# Patient Record
Sex: FEMALE | Race: WHITE | NOT HISPANIC OR LATINO | Employment: OTHER | URBAN - METROPOLITAN AREA
[De-identification: names, ages, dates, MRNs, and addresses within clinical notes are randomized per-mention and may not be internally consistent; named-entity substitution may affect disease eponyms.]

---

## 2021-01-14 DIAGNOSIS — Z23 NEED FOR VACCINATION: ICD-10-CM

## 2021-10-08 ENCOUNTER — OFFICE VISIT (OUTPATIENT)
Dept: URGENT CARE | Facility: CLINIC | Age: 86
End: 2021-10-08
Payer: MEDICARE

## 2021-10-08 VITALS
TEMPERATURE: 98.7 F | WEIGHT: 146 LBS | OXYGEN SATURATION: 93 % | HEART RATE: 73 BPM | DIASTOLIC BLOOD PRESSURE: 80 MMHG | SYSTOLIC BLOOD PRESSURE: 156 MMHG | RESPIRATION RATE: 14 BRPM

## 2021-10-08 DIAGNOSIS — R53.83 FATIGUE, UNSPECIFIED TYPE: ICD-10-CM

## 2021-10-08 LAB
APPEARANCE UR: NORMAL
BILIRUB UR STRIP-MCNC: NEGATIVE MG/DL
COLOR UR AUTO: NORMAL
EXTERNAL QUALITY CONTROL: NORMAL
GLUCOSE UR STRIP.AUTO-MCNC: NEGATIVE MG/DL
KETONES UR STRIP.AUTO-MCNC: NEGATIVE MG/DL
LEUKOCYTE ESTERASE UR QL STRIP.AUTO: NEGATIVE
NITRITE UR QL STRIP.AUTO: NEGATIVE
PH UR STRIP.AUTO: 6.5 [PH] (ref 5–8)
PROT UR QL STRIP: 100 MG/DL
RBC UR QL AUTO: NORMAL
SARS-COV+SARS-COV-2 AG RESP QL IA.RAPID: NEGATIVE
SP GR UR STRIP.AUTO: 1.02
UROBILINOGEN UR STRIP-MCNC: 0.2 MG/DL

## 2021-10-08 PROCEDURE — 87426 SARSCOV CORONAVIRUS AG IA: CPT | Performed by: PHYSICIAN ASSISTANT

## 2021-10-08 PROCEDURE — 81002 URINALYSIS NONAUTO W/O SCOPE: CPT | Performed by: PHYSICIAN ASSISTANT

## 2021-10-08 PROCEDURE — 99203 OFFICE O/P NEW LOW 30 MIN: CPT | Mod: CS | Performed by: PHYSICIAN ASSISTANT

## 2021-10-08 ASSESSMENT — ENCOUNTER SYMPTOMS
NECK PAIN: 0
PALPITATIONS: 0
CHILLS: 1
DIZZINESS: 0
FLANK PAIN: 0
SORE THROAT: 0
WEAKNESS: 0
FOCAL WEAKNESS: 0
BLURRED VISION: 0
SINUS PAIN: 0
ORTHOPNEA: 0
ABDOMINAL PAIN: 0
DIARRHEA: 0
SHORTNESS OF BREATH: 0
WEIGHT LOSS: 0
HEADACHES: 0
STRIDOR: 0
HEARTBURN: 0
SPUTUM PRODUCTION: 0
NAUSEA: 0
MYALGIAS: 0
WHEEZING: 0
DOUBLE VISION: 0
TINGLING: 0
SPEECH CHANGE: 0
DIAPHORESIS: 0
VOMITING: 0
SENSORY CHANGE: 0
COUGH: 0
FEVER: 0
TREMORS: 0

## 2021-10-08 NOTE — PROGRESS NOTES
Subjective:   Zenia Sidhu is a 86 y.o. female who presents for Other (feeling exhausted x a couple of days and feeling really tired and cold. pt preffers paper Rx)      HPI:  This is a very pleasant 86-year-old female presenting to the clinic with fatigue x2 days.  Patient recently moved to town 2 days ago from [place].  She drove across the country in 5 days.  States her symptoms are fairly mild at this time she just feels tired and slightly cold.  States she is staying at her son's house at the moment and it has been very chilly inside.  She has not noticed any other symptoms at this time.  Denies any chest pain, shortness of breath, lightheadedness, abdominal complaints, urinary complaints or paresthesias of the extremities.  She has no known ill contacts.  States she is still maintaining adequate nutrition and hydration.  Having normal bowel movements.  Denies any lower extremity swelling.  No new change to medications.  Currently does not have her medication list with her.  States she has a history of thyroid disease for which she is taking medication.  Also states she has a pacemaker in place for a prior history of A. fib.  Takes Crestor and has her cholesterol under control.  No history of DVT or PE.  Had a Covid test preformed yesterday in outside clinic has yet to receive results.      Review of Systems   Constitutional: Positive for chills and malaise/fatigue. Negative for diaphoresis, fever and weight loss.   HENT: Negative for congestion, ear pain, sinus pain and sore throat.    Eyes: Negative for blurred vision and double vision.   Respiratory: Negative for cough, sputum production, shortness of breath, wheezing and stridor.    Cardiovascular: Negative for chest pain, palpitations, orthopnea and leg swelling.   Gastrointestinal: Negative for abdominal pain, diarrhea, heartburn, nausea and vomiting.   Genitourinary: Negative for dysuria, flank pain, frequency, hematuria and urgency.    Musculoskeletal: Negative for myalgias and neck pain.   Skin: Negative for rash.   Neurological: Negative for dizziness, tingling, tremors, sensory change, speech change, focal weakness, weakness and headaches.   Endo/Heme/Allergies: Negative for environmental allergies.       Medications:    • This patient does not have an active medication from one of the medication groupers.    Allergies: Patient has no known allergies.    Problem List: Zenia Sidhu does not have a problem list on file.    Surgical History:  No past surgical history on file.    Past Social Hx: Zenia Sidhu  reports that she has never smoked. She has never used smokeless tobacco.     Past Family Hx:  Zenia Sidhu family history is not on file.     Problem list, medications, and allergies reviewed by myself today in Epic.     Objective:     /80 (BP Location: Left arm, Patient Position: Sitting, BP Cuff Size: Adult long)   Pulse 73   Temp 37.1 °C (98.7 °F) (Temporal)   Resp 14   Wt 66.2 kg (146 lb)   SpO2 93%     Physical Exam  Constitutional:       General: She is not in acute distress.     Appearance: Normal appearance. She is not ill-appearing, toxic-appearing or diaphoretic.   HENT:      Head: Normocephalic and atraumatic.      Right Ear: Tympanic membrane, ear canal and external ear normal.      Left Ear: Tympanic membrane, ear canal and external ear normal.      Nose: Nose normal. No congestion or rhinorrhea.      Mouth/Throat:      Mouth: Mucous membranes are moist.      Pharynx: No oropharyngeal exudate or posterior oropharyngeal erythema.   Eyes:      Extraocular Movements: Extraocular movements intact.      Conjunctiva/sclera: Conjunctivae normal.      Pupils: Pupils are equal, round, and reactive to light.   Cardiovascular:      Rate and Rhythm: Normal rate and regular rhythm.      Pulses: Normal pulses.      Heart sounds: Normal heart sounds.   Pulmonary:      Effort: Pulmonary effort is normal.      Breath  sounds: Normal breath sounds. No wheezing, rhonchi or rales.   Abdominal:      Palpations: Abdomen is soft.      Tenderness: There is no abdominal tenderness. There is no right CVA tenderness, left CVA tenderness or guarding.   Musculoskeletal:      Cervical back: Normal range of motion. No rigidity or tenderness. No muscular tenderness.   Lymphadenopathy:      Cervical: No cervical adenopathy.   Skin:     General: Skin is warm and dry.      Capillary Refill: Capillary refill takes less than 2 seconds.   Neurological:      General: No focal deficit present.      Mental Status: She is alert and oriented to person, place, and time. Mental status is at baseline.      Cranial Nerves: No cranial nerve deficit.      Motor: No weakness.      Gait: Gait normal.   Psychiatric:         Mood and Affect: Mood normal.         Thought Content: Thought content normal.       Lab Results/POC Test Results   Results for orders placed or performed in visit on 10/08/21   POCT Urinalysis   Result Value Ref Range    POC Color Mabel Negative    POC Appearance Cloudy Negative    POC Leukocyte Esterase Negative Negative    POC Nitrites Negative Negative    POC Urobiligen 0.2 Negative (0.2) mg/dL    POC Protein 100 Negative mg/dL    POC Urine PH 6.5 5.0 - 8.0    POC Blood Trace-Intact Negative    POC Specific Gravity 1.025 <1.005 - >1.030    POC Ketones Negative Negative mg/dL    POC Bilirubin Negative Negative mg/dL    POC Glucose Negative Negative mg/dL   POCT SARS-COV Antigen LOLIS (Symptomatic Only)   Result Value Ref Range    Internal  Pass     SARS-COV ANTIGEN LOLIS Negative              Assessment/Plan:     Comments/MDM:     • Differential diagnosis and treatment options were discussed with the patient at length today.  Patient has been dealing with fatigue for the last 2 days.  No other symptoms are present.  Recently moved to town from [place].  They drove across the country in 5 days.  Believes she may not have been  eating and drinking enough.  States she has tried to increase her intake over the last couple days.  It is very cold at her son's house she mentions to me.  On exam patient has all normal findings.  Lung sounds clear to auscultation.  Heart is appropriately paced.  Pulses palpable in all extremities.  No focal neurological deficits.  Urinalysis within normal limits.  Negative rapid Covid test.  Currently pending PCR Covid test.  Discussed the patient that there is a vast amount of differentials that can cause fatigue.  She understands of her symptoms worsen or continue for any length of time to go to the emergency department for a more in-depth evaluation.  Please call with any questions or concerns.     Diagnosis and associated orders:     1. Fatigue, unspecified type  POCT Urinalysis    POCT SARS-COV Antigen LOLIS (Symptomatic Only)              Differential diagnosis, natural history, supportive care, and indications for immediate follow-up discussed.    Advised the patient to follow-up with the primary care physician for recheck, reevaluation, and consideration of further management.    Please note that this dictation was created using voice recognition software. I have made reasonable attempt to correct obvious errors, but I expect that there are errors of grammar and possibly content that I did not discover before finalizing the note.    This note was electronically signed by THOMAS Busby PA-C

## 2021-10-15 ENCOUNTER — HOSPITAL ENCOUNTER (INPATIENT)
Facility: MEDICAL CENTER | Age: 86
LOS: 1 days | DRG: 178 | End: 2021-10-16
Attending: EMERGENCY MEDICINE | Admitting: INTERNAL MEDICINE
Payer: MEDICARE

## 2021-10-15 ENCOUNTER — APPOINTMENT (OUTPATIENT)
Dept: RADIOLOGY | Facility: MEDICAL CENTER | Age: 86
DRG: 178 | End: 2021-10-15
Attending: EMERGENCY MEDICINE
Payer: MEDICARE

## 2021-10-15 DIAGNOSIS — N39.0 URINARY TRACT INFECTION WITHOUT HEMATURIA, SITE UNSPECIFIED: ICD-10-CM

## 2021-10-15 DIAGNOSIS — R09.02 HYPOXIA: ICD-10-CM

## 2021-10-15 DIAGNOSIS — U07.1 COVID-19 VIRUS INFECTION: ICD-10-CM

## 2021-10-15 DIAGNOSIS — I50.21 ACUTE SYSTOLIC CONGESTIVE HEART FAILURE (HCC): ICD-10-CM

## 2021-10-15 PROBLEM — R79.89 ELEVATED TROPONIN: Status: ACTIVE | Noted: 2021-10-15

## 2021-10-15 PROBLEM — I48.0 PAROXYSMAL ATRIAL FIBRILLATION (HCC): Chronic | Status: ACTIVE | Noted: 2021-10-15

## 2021-10-15 PROBLEM — E03.9 HYPOTHYROIDISM: Status: ACTIVE | Noted: 2021-10-15

## 2021-10-15 PROBLEM — I10 HTN (HYPERTENSION): Chronic | Status: ACTIVE | Noted: 2021-10-15

## 2021-10-15 PROBLEM — R06.02 SOB (SHORTNESS OF BREATH): Status: ACTIVE | Noted: 2021-10-15

## 2021-10-15 LAB
ALBUMIN SERPL BCP-MCNC: 3.9 G/DL (ref 3.2–4.9)
ALBUMIN/GLOB SERPL: 1.2 G/DL
ALP SERPL-CCNC: 73 U/L (ref 30–99)
ALT SERPL-CCNC: 41 U/L (ref 2–50)
ANION GAP SERPL CALC-SCNC: 11 MMOL/L (ref 7–16)
ANISOCYTOSIS BLD QL SMEAR: ABNORMAL
APPEARANCE UR: ABNORMAL
AST SERPL-CCNC: 56 U/L (ref 12–45)
BACTERIA #/AREA URNS HPF: ABNORMAL /HPF
BASOPHILS # BLD AUTO: 0.9 % (ref 0–1.8)
BASOPHILS # BLD: 0.05 K/UL (ref 0–0.12)
BILIRUB SERPL-MCNC: 1.1 MG/DL (ref 0.1–1.5)
BILIRUB UR QL STRIP.AUTO: ABNORMAL
BUN SERPL-MCNC: 16 MG/DL (ref 8–22)
BURR CELLS BLD QL SMEAR: NORMAL
CALCIUM SERPL-MCNC: 8.9 MG/DL (ref 8.5–10.5)
CHLORIDE SERPL-SCNC: 97 MMOL/L (ref 96–112)
CO2 SERPL-SCNC: 24 MMOL/L (ref 20–33)
COLOR UR: ABNORMAL
CREAT SERPL-MCNC: 0.77 MG/DL (ref 0.5–1.4)
EKG IMPRESSION: NORMAL
EOSINOPHIL # BLD AUTO: 0 K/UL (ref 0–0.51)
EOSINOPHIL NFR BLD: 0 % (ref 0–6.9)
EPI CELLS #/AREA URNS HPF: ABNORMAL /HPF
ERYTHROCYTE [DISTWIDTH] IN BLOOD BY AUTOMATED COUNT: 53.2 FL (ref 35.9–50)
FLUAV AG SPEC QL IA: NEGATIVE
FLUBV AG SPEC QL IA: NEGATIVE
GLOBULIN SER CALC-MCNC: 3.3 G/DL (ref 1.9–3.5)
GLUCOSE SERPL-MCNC: 118 MG/DL (ref 65–99)
GLUCOSE UR STRIP.AUTO-MCNC: NEGATIVE MG/DL
HCT VFR BLD AUTO: 36.9 % (ref 37–47)
HGB BLD-MCNC: 12.2 G/DL (ref 12–16)
HYALINE CASTS #/AREA URNS LPF: ABNORMAL /LPF
KETONES UR STRIP.AUTO-MCNC: 15 MG/DL
LEUKOCYTE ESTERASE UR QL STRIP.AUTO: ABNORMAL
LYMPHOCYTES # BLD AUTO: 0.39 K/UL (ref 1–4.8)
LYMPHOCYTES NFR BLD: 7 % (ref 22–41)
MAGNESIUM SERPL-MCNC: 2 MG/DL (ref 1.5–2.5)
MANUAL DIFF BLD: NORMAL
MCH RBC QN AUTO: 32 PG (ref 27–33)
MCHC RBC AUTO-ENTMCNC: 33.1 G/DL (ref 33.6–35)
MCV RBC AUTO: 96.9 FL (ref 81.4–97.8)
MICRO URNS: ABNORMAL
MONOCYTES # BLD AUTO: 0.25 K/UL (ref 0–0.85)
MONOCYTES NFR BLD AUTO: 4.4 % (ref 0–13.4)
MORPHOLOGY BLD-IMP: NORMAL
NEUTROPHILS # BLD AUTO: 4.91 K/UL (ref 2–7.15)
NEUTROPHILS NFR BLD: 87.7 % (ref 44–72)
NITRITE UR QL STRIP.AUTO: POSITIVE
NRBC # BLD AUTO: 0 K/UL
NRBC BLD-RTO: 0 /100 WBC
NT-PROBNP SERPL IA-MCNC: 1865 PG/ML (ref 0–125)
PH UR STRIP.AUTO: 5.5 [PH] (ref 5–8)
PLATELET # BLD AUTO: 171 K/UL (ref 164–446)
PLATELET BLD QL SMEAR: NORMAL
PMV BLD AUTO: 11.1 FL (ref 9–12.9)
POIKILOCYTOSIS BLD QL SMEAR: NORMAL
POTASSIUM SERPL-SCNC: 4 MMOL/L (ref 3.6–5.5)
PROT SERPL-MCNC: 7.2 G/DL (ref 6–8.2)
PROT UR QL STRIP: 300 MG/DL
RBC # BLD AUTO: 3.81 M/UL (ref 4.2–5.4)
RBC # URNS HPF: ABNORMAL /HPF
RBC BLD AUTO: PRESENT
RBC UR QL AUTO: ABNORMAL
SARS-COV+SARS-COV-2 AG RESP QL IA.RAPID: DETECTED
SODIUM SERPL-SCNC: 132 MMOL/L (ref 135–145)
SP GR UR STRIP.AUTO: 1.03
SPECIMEN SOURCE: ABNORMAL
TROPONIN T SERPL-MCNC: 21 NG/L (ref 6–19)
UROBILINOGEN UR STRIP.AUTO-MCNC: 1 MG/DL
WBC # BLD AUTO: 5.6 K/UL (ref 4.8–10.8)
WBC #/AREA URNS HPF: ABNORMAL /HPF

## 2021-10-15 PROCEDURE — 99223 1ST HOSP IP/OBS HIGH 75: CPT | Mod: AI | Performed by: INTERNAL MEDICINE

## 2021-10-15 PROCEDURE — 80053 COMPREHEN METABOLIC PANEL: CPT

## 2021-10-15 PROCEDURE — 700111 HCHG RX REV CODE 636 W/ 250 OVERRIDE (IP): Performed by: EMERGENCY MEDICINE

## 2021-10-15 PROCEDURE — 700102 HCHG RX REV CODE 250 W/ 637 OVERRIDE(OP): Performed by: STUDENT IN AN ORGANIZED HEALTH CARE EDUCATION/TRAINING PROGRAM

## 2021-10-15 PROCEDURE — 99285 EMERGENCY DEPT VISIT HI MDM: CPT

## 2021-10-15 PROCEDURE — 85027 COMPLETE CBC AUTOMATED: CPT

## 2021-10-15 PROCEDURE — 81001 URINALYSIS AUTO W/SCOPE: CPT

## 2021-10-15 PROCEDURE — 96374 THER/PROPH/DIAG INJ IV PUSH: CPT

## 2021-10-15 PROCEDURE — 93005 ELECTROCARDIOGRAM TRACING: CPT | Performed by: EMERGENCY MEDICINE

## 2021-10-15 PROCEDURE — 87077 CULTURE AEROBIC IDENTIFY: CPT

## 2021-10-15 PROCEDURE — 93005 ELECTROCARDIOGRAM TRACING: CPT

## 2021-10-15 PROCEDURE — 83735 ASSAY OF MAGNESIUM: CPT

## 2021-10-15 PROCEDURE — 87186 SC STD MICRODIL/AGAR DIL: CPT

## 2021-10-15 PROCEDURE — 83880 ASSAY OF NATRIURETIC PEPTIDE: CPT

## 2021-10-15 PROCEDURE — 84484 ASSAY OF TROPONIN QUANT: CPT

## 2021-10-15 PROCEDURE — 87400 INFLUENZA A/B EACH AG IA: CPT | Mod: 91

## 2021-10-15 PROCEDURE — 71045 X-RAY EXAM CHEST 1 VIEW: CPT

## 2021-10-15 PROCEDURE — 87426 SARSCOV CORONAVIRUS AG IA: CPT

## 2021-10-15 PROCEDURE — C9803 HOPD COVID-19 SPEC COLLECT: HCPCS | Performed by: EMERGENCY MEDICINE

## 2021-10-15 PROCEDURE — A9270 NON-COVERED ITEM OR SERVICE: HCPCS | Performed by: STUDENT IN AN ORGANIZED HEALTH CARE EDUCATION/TRAINING PROGRAM

## 2021-10-15 PROCEDURE — 96375 TX/PRO/DX INJ NEW DRUG ADDON: CPT

## 2021-10-15 PROCEDURE — 700102 HCHG RX REV CODE 250 W/ 637 OVERRIDE(OP): Performed by: INTERNAL MEDICINE

## 2021-10-15 PROCEDURE — A9270 NON-COVERED ITEM OR SERVICE: HCPCS | Performed by: INTERNAL MEDICINE

## 2021-10-15 PROCEDURE — 8E0ZXY6 ISOLATION: ICD-10-PCS | Performed by: INTERNAL MEDICINE

## 2021-10-15 PROCEDURE — 87086 URINE CULTURE/COLONY COUNT: CPT

## 2021-10-15 PROCEDURE — 770020 HCHG ROOM/CARE - TELE (206)

## 2021-10-15 PROCEDURE — 85007 BL SMEAR W/DIFF WBC COUNT: CPT

## 2021-10-15 RX ORDER — BISOPROLOL FUMARATE 5 MG/1
5 TABLET, FILM COATED ORAL 2 TIMES DAILY
Status: DISCONTINUED | OUTPATIENT
Start: 2021-10-15 | End: 2021-10-16 | Stop reason: HOSPADM

## 2021-10-15 RX ORDER — BISOPROLOL FUMARATE 5 MG/1
5 TABLET, FILM COATED ORAL 2 TIMES DAILY
COMMUNITY

## 2021-10-15 RX ORDER — DEXAMETHASONE SODIUM PHOSPHATE 4 MG/ML
6 INJECTION, SOLUTION INTRA-ARTICULAR; INTRALESIONAL; INTRAMUSCULAR; INTRAVENOUS; SOFT TISSUE ONCE
Status: COMPLETED | OUTPATIENT
Start: 2021-10-15 | End: 2021-10-15

## 2021-10-15 RX ORDER — ISOSORBIDE MONONITRATE 30 MG/1
30 TABLET, EXTENDED RELEASE ORAL
COMMUNITY

## 2021-10-15 RX ORDER — ISOSORBIDE MONONITRATE 30 MG/1
30 TABLET, EXTENDED RELEASE ORAL
Status: DISCONTINUED | OUTPATIENT
Start: 2021-10-16 | End: 2021-10-16 | Stop reason: HOSPADM

## 2021-10-15 RX ORDER — ENALAPRILAT 1.25 MG/ML
1.25 INJECTION INTRAVENOUS EVERY 6 HOURS PRN
Status: DISCONTINUED | OUTPATIENT
Start: 2021-10-15 | End: 2021-10-16 | Stop reason: HOSPADM

## 2021-10-15 RX ORDER — ONDANSETRON 2 MG/ML
4 INJECTION INTRAMUSCULAR; INTRAVENOUS EVERY 4 HOURS PRN
Status: DISCONTINUED | OUTPATIENT
Start: 2021-10-15 | End: 2021-10-15

## 2021-10-15 RX ORDER — LABETALOL HYDROCHLORIDE 5 MG/ML
10 INJECTION, SOLUTION INTRAVENOUS EVERY 4 HOURS PRN
Status: DISCONTINUED | OUTPATIENT
Start: 2021-10-15 | End: 2021-10-16 | Stop reason: HOSPADM

## 2021-10-15 RX ORDER — ONDANSETRON 4 MG/1
4 TABLET, ORALLY DISINTEGRATING ORAL EVERY 4 HOURS PRN
Status: DISCONTINUED | OUTPATIENT
Start: 2021-10-15 | End: 2021-10-15

## 2021-10-15 RX ORDER — CEFTRIAXONE 1 G/1
1 INJECTION, POWDER, FOR SOLUTION INTRAMUSCULAR; INTRAVENOUS ONCE
Status: COMPLETED | OUTPATIENT
Start: 2021-10-15 | End: 2021-10-15

## 2021-10-15 RX ORDER — LISINOPRIL 5 MG/1
5 TABLET ORAL
Status: DISCONTINUED | OUTPATIENT
Start: 2021-10-16 | End: 2021-10-16 | Stop reason: HOSPADM

## 2021-10-15 RX ORDER — LEVOTHYROXINE SODIUM 112 UG/1
112 TABLET ORAL SEE ADMIN INSTRUCTIONS
COMMUNITY

## 2021-10-15 RX ORDER — ROSUVASTATIN CALCIUM 10 MG/1
10 TABLET, COATED ORAL EVERY EVENING
Status: DISCONTINUED | OUTPATIENT
Start: 2021-10-15 | End: 2021-10-16 | Stop reason: HOSPADM

## 2021-10-15 RX ORDER — AMOXICILLIN 250 MG
2 CAPSULE ORAL 2 TIMES DAILY
Status: DISCONTINUED | OUTPATIENT
Start: 2021-10-15 | End: 2021-10-16 | Stop reason: HOSPADM

## 2021-10-15 RX ORDER — FUROSEMIDE 10 MG/ML
40 INJECTION INTRAMUSCULAR; INTRAVENOUS ONCE
Status: COMPLETED | OUTPATIENT
Start: 2021-10-15 | End: 2021-10-15

## 2021-10-15 RX ORDER — CHOLECALCIFEROL (VITAMIN D3) 125 MCG
5 CAPSULE ORAL NIGHTLY PRN
Status: DISCONTINUED | OUTPATIENT
Start: 2021-10-15 | End: 2021-10-16 | Stop reason: HOSPADM

## 2021-10-15 RX ORDER — POTASSIUM CHLORIDE 750 MG/1
10 TABLET, EXTENDED RELEASE ORAL
COMMUNITY

## 2021-10-15 RX ORDER — ROSUVASTATIN CALCIUM 10 MG/1
10 TABLET, COATED ORAL EVERY EVENING
COMMUNITY

## 2021-10-15 RX ORDER — FUROSEMIDE 10 MG/ML
20 INJECTION INTRAMUSCULAR; INTRAVENOUS
Status: DISCONTINUED | OUTPATIENT
Start: 2021-10-15 | End: 2021-10-16 | Stop reason: HOSPADM

## 2021-10-15 RX ORDER — LEVOTHYROXINE SODIUM 112 UG/1
112 TABLET ORAL
Status: DISCONTINUED | OUTPATIENT
Start: 2021-10-16 | End: 2021-10-16 | Stop reason: HOSPADM

## 2021-10-15 RX ORDER — POLYETHYLENE GLYCOL 3350 17 G/17G
1 POWDER, FOR SOLUTION ORAL
Status: DISCONTINUED | OUTPATIENT
Start: 2021-10-15 | End: 2021-10-16 | Stop reason: HOSPADM

## 2021-10-15 RX ORDER — BISACODYL 10 MG
10 SUPPOSITORY, RECTAL RECTAL
Status: DISCONTINUED | OUTPATIENT
Start: 2021-10-15 | End: 2021-10-16 | Stop reason: HOSPADM

## 2021-10-15 RX ADMIN — BISOPROLOL FUMARATE 5 MG: 5 TABLET, FILM COATED ORAL at 23:28

## 2021-10-15 RX ADMIN — Medication 5 MG: at 23:07

## 2021-10-15 RX ADMIN — APIXABAN 5 MG: 5 TABLET, FILM COATED ORAL at 20:34

## 2021-10-15 RX ADMIN — ASPIRIN 81 MG: 81 TABLET, COATED ORAL at 20:34

## 2021-10-15 RX ADMIN — DEXAMETHASONE SODIUM PHOSPHATE 6 MG: 4 INJECTION, SOLUTION INTRA-ARTICULAR; INTRALESIONAL; INTRAMUSCULAR; INTRAVENOUS; SOFT TISSUE at 20:40

## 2021-10-15 RX ADMIN — CEFTRIAXONE SODIUM 1 G: 1 INJECTION, POWDER, FOR SOLUTION INTRAMUSCULAR; INTRAVENOUS at 18:50

## 2021-10-15 RX ADMIN — ROSUVASTATIN CALCIUM 10 MG: 10 TABLET, FILM COATED ORAL at 23:07

## 2021-10-15 RX ADMIN — FUROSEMIDE 40 MG: 10 INJECTION, SOLUTION INTRAMUSCULAR; INTRAVENOUS at 18:50

## 2021-10-15 ASSESSMENT — CHA2DS2 SCORE
VASCULAR DISEASE: YES
CHF OR LEFT VENTRICULAR DYSFUNCTION: YES
SEX: FEMALE
CHA2DS2 VASC SCORE: 6
AGE 75 OR GREATER: YES
PRIOR STROKE OR TIA OR THROMBOEMBOLISM: NO
HYPERTENSION: YES
AGE 65 TO 74: NO
DIABETES: NO

## 2021-10-15 ASSESSMENT — COGNITIVE AND FUNCTIONAL STATUS - GENERAL
PERSONAL GROOMING: A LITTLE
TOILETING: A LITTLE
EATING MEALS: A LITTLE
CLIMB 3 TO 5 STEPS WITH RAILING: A LITTLE
MOBILITY SCORE: 21
DAILY ACTIVITIY SCORE: 18
DRESSING REGULAR LOWER BODY CLOTHING: A LITTLE
SUGGESTED CMS G CODE MODIFIER MOBILITY: CJ
WALKING IN HOSPITAL ROOM: A LITTLE
SUGGESTED CMS G CODE MODIFIER DAILY ACTIVITY: CK
DRESSING REGULAR UPPER BODY CLOTHING: A LITTLE
HELP NEEDED FOR BATHING: A LITTLE
MOVING FROM LYING ON BACK TO SITTING ON SIDE OF FLAT BED: A LITTLE

## 2021-10-15 ASSESSMENT — LIFESTYLE VARIABLES
ALCOHOL_USE: YES
HOW MANY TIMES IN THE PAST YEAR HAVE YOU HAD 5 OR MORE DRINKS IN A DAY: 0
EVER FELT BAD OR GUILTY ABOUT YOUR DRINKING: NO
TOTAL SCORE: 0
TOTAL SCORE: 0
CONSUMPTION TOTAL: NEGATIVE
ON A TYPICAL DAY WHEN YOU DRINK ALCOHOL HOW MANY DRINKS DO YOU HAVE: 0
HAVE PEOPLE ANNOYED YOU BY CRITICIZING YOUR DRINKING: NO
AVERAGE NUMBER OF DAYS PER WEEK YOU HAVE A DRINK CONTAINING ALCOHOL: 1
TOTAL SCORE: 0
EVER HAD A DRINK FIRST THING IN THE MORNING TO STEADY YOUR NERVES TO GET RID OF A HANGOVER: NO
DO YOU DRINK ALCOHOL: NO
HAVE YOU EVER FELT YOU SHOULD CUT DOWN ON YOUR DRINKING: NO

## 2021-10-15 ASSESSMENT — ENCOUNTER SYMPTOMS
MUSCULOSKELETAL NEGATIVE: 1
WEAKNESS: 1
PSYCHIATRIC NEGATIVE: 1
SHORTNESS OF BREATH: 1
GASTROINTESTINAL NEGATIVE: 1
EYES NEGATIVE: 1
ORTHOPNEA: 1
CONSTITUTIONAL NEGATIVE: 1

## 2021-10-15 ASSESSMENT — PATIENT HEALTH QUESTIONNAIRE - PHQ9
1. LITTLE INTEREST OR PLEASURE IN DOING THINGS: NOT AT ALL
SUM OF ALL RESPONSES TO PHQ9 QUESTIONS 1 AND 2: 0
2. FEELING DOWN, DEPRESSED, IRRITABLE, OR HOPELESS: NOT AT ALL

## 2021-10-15 ASSESSMENT — FIBROSIS 4 INDEX: FIB4 SCORE: 4.35

## 2021-10-15 NOTE — ED TRIAGE NOTES
"Pt to triage, c/o not \" feeling well for the last week\" , c/o SOB/ cough / fatigue.  Pt states \" she has had 3 -4 covid tests in the last week, all negative\" . Pt has recently moved here with her  from [place]. Pt not sure if it is the altitude. Has  a hx of heart problems\" / EKG ordered  "

## 2021-10-15 NOTE — ED PROVIDER NOTES
ED Provider Note    CHIEF COMPLAINT  Chief Complaint   Patient presents with   • Fatigue   • Loss of Appetite       HPI  Zenia Sidhu is a 86 y.o. female who presents with a week of fatigue, 3 to 4 days of cough and shortness of breath with exertion.  She has felt chilled but no fever.  No headache, body aches, vomiting.  She has had some diarrhea.  No loss of taste or smell.  She is vaccinated for Covid.  She has had 5 - test this week.  She just moved here from [place].  History of pacemaker and A. fib on Eliquis.  No history of DVT PE heart attack or heart failure.  She had 3 hours of chest pain this morning in the right upper chest but is chest pain-free now.  No abdominal pain.    REVIEW OF SYSTEMS  Pertinent positives include: Fatigue, dyspnea with exertion, chilled, cough.  Pertinent negatives include: Leg swelling, calf pain, DVT or PE history, orthopnea.  10+ systems reviewed and negative.      PAST MEDICAL HISTORY  Atrial fibrillation, pacemaker    SOCIAL HISTORY  Currently moved here from [place] 5 days ago and traveled here by car    CURRENT MEDICATIONS  Includes Eliquis, see Epic.    ALLERGIES  No Known Allergies    PHYSICAL EXAM  VITAL SIGNS: /76   Pulse 81   Temp 36.4 °C (97.5 °F) (Temporal)   Resp 16   Wt 62.5 kg (137 lb 12.6 oz)   SpO2 100%   Reviewed and afebrile, no hypoxia room air  Constitutional: Well developed, Well nourished, well-appearing.  HENT: Normocephalic, atraumatic, bilateral external ears normal, Wearing mask.   Eyes: PERRLA, conjunctiva pink, no scleral icterus.   Cardiovascular: Regular S1-S2 without murmur, rub, gallop.  No dependent edema or calf tenderness.  No JVD or bruit.  Respiratory: No rhonchi, wheeze or cough.  Rales at the bases.  Gastrointestinal: Soft, nontender, nondistended, no organomegaly.  Skin: No erythema, no rash.  Venous dermopathy change of the legs  Genitourinary:  No costovertebral angle tenderness.   Neurologic: Alert & oriented x 3,  cranial nerves 2-12 intact by passive exam.  No focal deficit noted.  Psychiatric: Affect normal, Judgment normal, Mood normal.     DIFFERENTIAL DIAGNOSIS:  Covid, pneumonia, heart failure, doubt PE, UTI, sepsis.    EKG  EKG Interpretation 5:01 PM    Interpreted by me.  Indication: Chest pain    Rhythm: Irregularly irregular  Rate: Normal at 78  Axis: normal  Ectopy: none  Conduction: Sure ventricular conduction delay pattern  ST/T Waves: no acute change  Q Waves: none  R Wave progression: Inferior and septal Q's  High voltage QRS    Clinical Impression: Rate Controlled atrial fibrillation, intraventricular conduction delay, LVH, prior inferior and anterior ischemia      RADIOLOGY/PROCEDURES  DX-CHEST-PORTABLE (1 VIEW)   Final Result      1.  Mild cardiomegaly and minimal interstitial edema.   2.  A few areas of linear atelectasis/scarring.             LABORATORY:  Results for orders placed or performed during the hospital encounter of 10/15/21   CBC with Differential   Result Value Ref Range    WBC 5.6 4.8 - 10.8 K/uL    RBC 3.81 (L) 4.20 - 5.40 M/uL    Hemoglobin 12.2 12.0 - 16.0 g/dL    Hematocrit 36.9 (L) 37.0 - 47.0 %    MCV 96.9 81.4 - 97.8 fL    MCH 32.0 27.0 - 33.0 pg    MCHC 33.1 (L) 33.6 - 35.0 g/dL    RDW 53.2 (H) 35.9 - 50.0 fL    Platelet Count 171 164 - 446 K/uL    MPV 11.1 9.0 - 12.9 fL    Neutrophils-Polys 87.70 (H) 44.00 - 72.00 %    Lymphocytes 7.00 (L) 22.00 - 41.00 %    Monocytes 4.40 0.00 - 13.40 %    Eosinophils 0.00 0.00 - 6.90 %    Basophils 0.90 0.00 - 1.80 %    Nucleated RBC 0.00 /100 WBC    Neutrophils (Absolute) 4.91 2.00 - 7.15 K/uL    Lymphs (Absolute) 0.39 (L) 1.00 - 4.80 K/uL    Monos (Absolute) 0.25 0.00 - 0.85 K/uL    Eos (Absolute) 0.00 0.00 - 0.51 K/uL    Baso (Absolute) 0.05 0.00 - 0.12 K/uL    NRBC (Absolute) 0.00 K/uL    Anisocytosis 1+    Comp Metabolic Panel   Result Value Ref Range    Sodium 132 (L) 135 - 145 mmol/L    Potassium 4.0 3.6 - 5.5 mmol/L    Chloride 97 96 - 112  mmol/L    Co2 24 20 - 33 mmol/L    Anion Gap 11.0 7.0 - 16.0    Glucose 118 (H) 65 - 99 mg/dL    Bun 16 8 - 22 mg/dL    Creatinine 0.77 0.50 - 1.40 mg/dL    Calcium 8.9 8.5 - 10.5 mg/dL    AST(SGOT) 56 (H) 12 - 45 U/L    ALT(SGPT) 41 2 - 50 U/L    Alkaline Phosphatase 73 30 - 99 U/L    Total Bilirubin 1.1 0.1 - 1.5 mg/dL    Albumin 3.9 3.2 - 4.9 g/dL    Total Protein 7.2 6.0 - 8.2 g/dL    Globulin 3.3 1.9 - 3.5 g/dL    A-G Ratio 1.2 g/dL   ESTIMATED GFR   Result Value Ref Range    GFR If African American >60 >60 mL/min/1.73 m 2    GFR If Non African American >60 >60 mL/min/1.73 m 2   DIFFERENTIAL MANUAL   Result Value Ref Range    Manual Diff Status PERFORMED    PERIPHERAL SMEAR REVIEW   Result Value Ref Range    Peripheral Smear Review see below    PLATELET ESTIMATE   Result Value Ref Range    Plt Estimation Normal    MORPHOLOGY   Result Value Ref Range    RBC Morphology Present     Poikilocytosis 1+     Echinocytes 1+    CoV, Flu A/B RAPID ANTIGEN: Collect one dry nasal swab    Specimen: Nasopharyngeal; Respirate   Result Value Ref Range    Influenza A AG by LOLIS Negative Negative    Influenza B AG by LOLIS Negative Negative    SARS-COV ANTIGEN LOLIS DETECTED (AA) Not-Detected    SARS-CoV-2 Source Nasal Swab    proBrain Natriuretic Peptide, NT   Result Value Ref Range    NT-proBNP 1865 (H) 0 - 125 pg/mL   TROPONIN   Result Value Ref Range    Troponin T 21 (H) 6 - 19 ng/L   URINALYSIS    Specimen: Urine   Result Value Ref Range    Color DK Yellow     Character Turbid (A)     Specific Gravity 1.028 <1.035    Ph 5.5 5.0 - 8.0    Glucose Negative Negative mg/dL    Ketones 15 (A) Negative mg/dL    Protein 300 (A) Negative mg/dL    Bilirubin Small (A) Negative    Urobilinogen, Urine 1.0 Negative    Nitrite Positive (A) Negative    Leukocyte Esterase Moderate (A) Negative    Occult Blood Moderate (A) Negative    Micro Urine Req Microscopic    URINE MICROSCOPIC (W/UA)   Result Value Ref Range    WBC Packed (A) /hpf    RBC 5-10  (A) /hpf    Bacteria Many (A) None /hpf    Epithelial Cells Few /hpf    Hyaline Cast 11-20 (A) /lpf       INTERVENTIONS:  Medications   dexamethasone (DECADRON) injection 6 mg (has no administration in time range)   cefTRIAXone (Rocephin) injection 1 g (1 g Intravenous Given 10/15/21 1850)   furosemide (LASIX) injection 40 mg (40 mg Intravenous Given 10/15/21 1850)       COURSE & MEDICAL DECISION MAKING  Well-appearing patient who is extremely fatigued and dyspneic has probable new onset left-sided heart failure, UTI without severe sepsis and Covid with transient hypoxia here if she dips to 88% on room air.  She will require admission for echocardiogram, oxygen, diuresis and antibiotics.  Case discussed with the hospitalist who will admit the patient..    PLAN:  As above    CONDITION: Guarded.    FINAL IMPRESSION  1. Acute systolic congestive heart failure (HCC)    2. Urinary tract infection without hematuria, site unspecified    3. COVID-19 virus infection    4. Hypoxia          Electronically signed by: Parth Hubbard M.D., 10/15/2021 4:58 PM

## 2021-10-16 ENCOUNTER — PATIENT OUTREACH (OUTPATIENT)
Dept: HEALTH INFORMATION MANAGEMENT | Facility: OTHER | Age: 86
End: 2021-10-16

## 2021-10-16 ENCOUNTER — APPOINTMENT (OUTPATIENT)
Dept: CARDIOLOGY | Facility: MEDICAL CENTER | Age: 86
DRG: 178 | End: 2021-10-16
Attending: INTERNAL MEDICINE
Payer: MEDICARE

## 2021-10-16 ENCOUNTER — APPOINTMENT (OUTPATIENT)
Dept: RADIOLOGY | Facility: MEDICAL CENTER | Age: 86
DRG: 178 | End: 2021-10-16
Attending: INTERNAL MEDICINE
Payer: MEDICARE

## 2021-10-16 VITALS
SYSTOLIC BLOOD PRESSURE: 106 MMHG | WEIGHT: 136.02 LBS | RESPIRATION RATE: 18 BRPM | DIASTOLIC BLOOD PRESSURE: 62 MMHG | HEART RATE: 72 BPM | TEMPERATURE: 96.9 F | OXYGEN SATURATION: 91 %

## 2021-10-16 LAB
ANION GAP SERPL CALC-SCNC: 15 MMOL/L (ref 7–16)
BASOPHILS # BLD AUTO: 0 % (ref 0–1.8)
BASOPHILS # BLD: 0 K/UL (ref 0–0.12)
BUN SERPL-MCNC: 19 MG/DL (ref 8–22)
BURR CELLS BLD QL SMEAR: NORMAL
CALCIUM SERPL-MCNC: 9.1 MG/DL (ref 8.5–10.5)
CHLORIDE SERPL-SCNC: 94 MMOL/L (ref 96–112)
CHOLEST SERPL-MCNC: 127 MG/DL (ref 100–199)
CO2 SERPL-SCNC: 22 MMOL/L (ref 20–33)
CREAT SERPL-MCNC: 0.72 MG/DL (ref 0.5–1.4)
EOSINOPHIL # BLD AUTO: 0 K/UL (ref 0–0.51)
EOSINOPHIL NFR BLD: 0 % (ref 0–6.9)
ERYTHROCYTE [DISTWIDTH] IN BLOOD BY AUTOMATED COUNT: 51.8 FL (ref 35.9–50)
GLUCOSE SERPL-MCNC: 153 MG/DL (ref 65–99)
HCT VFR BLD AUTO: 37.8 % (ref 37–47)
HDLC SERPL-MCNC: 31 MG/DL
HGB BLD-MCNC: 12.8 G/DL (ref 12–16)
LDLC SERPL CALC-MCNC: 79 MG/DL
LV EJECT FRACT  99904: 65
LV EJECT FRACT MOD 2C 99903: 59.16
LV EJECT FRACT MOD 4C 99902: 73.56
LV EJECT FRACT MOD BP 99901: 67.78
LYMPHOCYTES # BLD AUTO: 0.06 K/UL (ref 1–4.8)
LYMPHOCYTES NFR BLD: 0.9 % (ref 22–41)
MANUAL DIFF BLD: NORMAL
MCH RBC QN AUTO: 32.2 PG (ref 27–33)
MCHC RBC AUTO-ENTMCNC: 33.9 G/DL (ref 33.6–35)
MCV RBC AUTO: 95 FL (ref 81.4–97.8)
MONOCYTES # BLD AUTO: 0.34 K/UL (ref 0–0.85)
MONOCYTES NFR BLD AUTO: 5.3 % (ref 0–13.4)
MORPHOLOGY BLD-IMP: NORMAL
NEUTROPHILS # BLD AUTO: 6 K/UL (ref 2–7.15)
NEUTROPHILS NFR BLD: 93.8 % (ref 44–72)
NRBC # BLD AUTO: 0 K/UL
NRBC BLD-RTO: 0 /100 WBC
PLATELET # BLD AUTO: 192 K/UL (ref 164–446)
PLATELET BLD QL SMEAR: NORMAL
PMV BLD AUTO: 11.4 FL (ref 9–12.9)
POIKILOCYTOSIS BLD QL SMEAR: NORMAL
POTASSIUM SERPL-SCNC: 4.1 MMOL/L (ref 3.6–5.5)
RBC # BLD AUTO: 3.98 M/UL (ref 4.2–5.4)
RBC BLD AUTO: PRESENT
SODIUM SERPL-SCNC: 131 MMOL/L (ref 135–145)
TRIGL SERPL-MCNC: 85 MG/DL (ref 0–149)
TROPONIN T SERPL-MCNC: 17 NG/L (ref 6–19)
TSH SERPL DL<=0.005 MIU/L-ACNC: 1.37 UIU/ML (ref 0.38–5.33)
WBC # BLD AUTO: 6.4 K/UL (ref 4.8–10.8)

## 2021-10-16 PROCEDURE — 85027 COMPLETE CBC AUTOMATED: CPT

## 2021-10-16 PROCEDURE — 80061 LIPID PANEL: CPT

## 2021-10-16 PROCEDURE — 84443 ASSAY THYROID STIM HORMONE: CPT

## 2021-10-16 PROCEDURE — A9270 NON-COVERED ITEM OR SERVICE: HCPCS | Performed by: INTERNAL MEDICINE

## 2021-10-16 PROCEDURE — 99238 HOSP IP/OBS DSCHRG MGMT 30/<: CPT | Mod: GC | Performed by: FAMILY MEDICINE

## 2021-10-16 PROCEDURE — 85007 BL SMEAR W/DIFF WBC COUNT: CPT

## 2021-10-16 PROCEDURE — 700111 HCHG RX REV CODE 636 W/ 250 OVERRIDE (IP): Performed by: INTERNAL MEDICINE

## 2021-10-16 PROCEDURE — 93325 DOPPLER ECHO COLOR FLOW MAPG: CPT

## 2021-10-16 PROCEDURE — 93321 DOPPLER ECHO F-UP/LMTD STD: CPT | Mod: 26 | Performed by: INTERNAL MEDICINE

## 2021-10-16 PROCEDURE — 700102 HCHG RX REV CODE 250 W/ 637 OVERRIDE(OP): Performed by: INTERNAL MEDICINE

## 2021-10-16 PROCEDURE — 84484 ASSAY OF TROPONIN QUANT: CPT

## 2021-10-16 PROCEDURE — 36415 COLL VENOUS BLD VENIPUNCTURE: CPT

## 2021-10-16 PROCEDURE — 93970 EXTREMITY STUDY: CPT | Mod: 26,GZ | Performed by: INTERNAL MEDICINE

## 2021-10-16 PROCEDURE — 93970 EXTREMITY STUDY: CPT

## 2021-10-16 PROCEDURE — 93308 TTE F-UP OR LMTD: CPT | Mod: 26 | Performed by: INTERNAL MEDICINE

## 2021-10-16 PROCEDURE — 80048 BASIC METABOLIC PNL TOTAL CA: CPT

## 2021-10-16 PROCEDURE — 93325 DOPPLER ECHO COLOR FLOW MAPG: CPT | Mod: 26 | Performed by: INTERNAL MEDICINE

## 2021-10-16 RX ORDER — LISINOPRIL 5 MG/1
5 TABLET ORAL DAILY
Qty: 30 TABLET | Refills: 3 | Status: SHIPPED | OUTPATIENT
Start: 2021-10-17

## 2021-10-16 RX ORDER — FUROSEMIDE 20 MG/1
20 TABLET ORAL DAILY
Qty: 30 TABLET | Refills: 3 | Status: SHIPPED | OUTPATIENT
Start: 2021-10-16

## 2021-10-16 RX ORDER — NITROFURANTOIN 25; 75 MG/1; MG/1
100 CAPSULE ORAL 2 TIMES DAILY
Qty: 10 CAPSULE | Refills: 0 | Status: SHIPPED | OUTPATIENT
Start: 2021-10-16 | End: 2021-10-21

## 2021-10-16 RX ADMIN — FUROSEMIDE 20 MG: 10 INJECTION, SOLUTION INTRAMUSCULAR; INTRAVENOUS at 05:17

## 2021-10-16 RX ADMIN — LISINOPRIL 5 MG: 5 TABLET ORAL at 05:17

## 2021-10-16 RX ADMIN — APIXABAN 5 MG: 5 TABLET, FILM COATED ORAL at 16:47

## 2021-10-16 RX ADMIN — BISOPROLOL FUMARATE 5 MG: 5 TABLET, FILM COATED ORAL at 05:17

## 2021-10-16 RX ADMIN — LEVOTHYROXINE SODIUM 112 MCG: 0.11 TABLET ORAL at 05:17

## 2021-10-16 RX ADMIN — ISOSORBIDE MONONITRATE 30 MG: 30 TABLET, EXTENDED RELEASE ORAL at 07:50

## 2021-10-16 RX ADMIN — APIXABAN 5 MG: 5 TABLET, FILM COATED ORAL at 05:17

## 2021-10-16 RX ADMIN — BISOPROLOL FUMARATE 5 MG: 5 TABLET, FILM COATED ORAL at 16:49

## 2021-10-16 RX ADMIN — ROSUVASTATIN CALCIUM 10 MG: 10 TABLET, FILM COATED ORAL at 16:47

## 2021-10-16 RX ADMIN — FUROSEMIDE 20 MG: 10 INJECTION, SOLUTION INTRAMUSCULAR; INTRAVENOUS at 16:43

## 2021-10-16 RX ADMIN — ASPIRIN 81 MG: 81 TABLET, COATED ORAL at 16:47

## 2021-10-16 ASSESSMENT — PATIENT HEALTH QUESTIONNAIRE - PHQ9
2. FEELING DOWN, DEPRESSED, IRRITABLE, OR HOPELESS: NOT AT ALL
SUM OF ALL RESPONSES TO PHQ9 QUESTIONS 1 AND 2: 0
1. LITTLE INTEREST OR PLEASURE IN DOING THINGS: NOT AT ALL

## 2021-10-16 ASSESSMENT — FIBROSIS 4 INDEX: FIB4 SCORE: 3.87

## 2021-10-16 ASSESSMENT — PAIN DESCRIPTION - PAIN TYPE: TYPE: ACUTE PAIN

## 2021-10-16 NOTE — ED NOTES
Med rec complete per Pt at bedside with medication list provided by Pt. Medication list reviewed and returned.  Allergies reviewed with Pt. No known drug allergies.  Pt unsure of the strengths of her vitamins/supplements.  Pt is on ASPIRIN and ELIQUIS.  Pt denies oral antibiotic usage within last 30 days.  Pt states that she uses [facility]. Preferred local pharmacy: [facility].

## 2021-10-16 NOTE — H&P
Hospital Medicine History & Physical Note    Date of Service  10/15/2021    Primary Care Physician  Pcp Pt States None    Consultants  None    Code Status  Full Code    Chief Complaint  Chief Complaint   Patient presents with   • Fatigue   • Loss of Appetite       History of Presenting Illness  Zenia Sidhu is a 86 y.o. female who presented 10/15/2021 with shortness of breath, cough, and fatigue. Patient states that she just moved from [place] to be closer to her son. She did drive for 4 days to come to [place]. She immediately came to ED upon arrival due to worsening shortness of breath. She notes that she becomes short of breath on walking and cannot walk much. She also has noticed that her legs have been swelling intermittently. In ED, patient tested positive for COVID antigen. However, she also appears to be in acute volume overload state. She does have significant cardiac risk factors however was never diagnosed with CHF in the past. Will admit patient for diuresis and for her hypoxia. She also was found to have UA positive for infectious causes and reports frequency.     I discussed the plan of care with patient.    Review of Systems  Review of Systems   Constitutional: Negative.    HENT: Negative.    Eyes: Negative.    Respiratory: Positive for shortness of breath.    Cardiovascular: Positive for orthopnea and leg swelling.   Gastrointestinal: Negative.    Genitourinary: Positive for frequency.   Musculoskeletal: Negative.    Skin: Negative.    Neurological: Positive for weakness.   Psychiatric/Behavioral: Negative.        Past Medical History  HTN, atrial fib, hypothyroidism    Surgical History  Denies past surgical history    Family History  Family history reviewed with patient. There is no family history that is pertinent to the chief complaint.     Social History   reports that she has never smoked. She has never used smokeless tobacco.    Allergies  No Known Allergies    Medications  Prior to Admission  Medications   Prescriptions Last Dose Informant Patient Reported? Taking?   COENZYME Q10 PO 10/15/2021 at 1000 Patient Yes Yes   Sig: Take 1 Tablet by mouth 1/2 hour after breakfast.   Cholecalciferol (VITAMIN D3 PO) 10/15/2021 at 1000 Patient Yes Yes   Sig: Take 1 Tablet by mouth 1/2 hour after breakfast.   Cyanocobalamin (VITAMIN B-12 PO) 10/15/2021 at 1000 Patient Yes Yes   Sig: Take 1 Tablet by mouth 1/2 hour after breakfast.   FOLIC ACID PO 10/15/2021 at 1000 Patient Yes Yes   Sig: Take 1 Tablet by mouth 1/2 hour after breakfast.   Multiple Vitamins-Minerals (EYE VITAMINS & MINERALS PO) 10/15/2021 at 1000 Patient Yes Yes   Sig: Take 1 Tablet by mouth 1/2 hour after breakfast.   Multiple Vitamins-Minerals (WOMENS MULTIVITAMIN) Tab 10/15/2021 at 1000 Patient Yes Yes   Sig: Take 1 Tablet by mouth 1/2 hour after breakfast.   ZINC SULFATE PO 10/15/2021 at 1000 Patient Yes Yes   Sig: Take 1 Tablet by mouth 1/2 hour after breakfast.   apixaban (ELIQUIS) 5mg Tab 10/15/2021 at 0930 Patient Yes Yes   Sig: Take 5 mg by mouth 2 times a day.   aspirin EC (ECOTRIN) 81 MG Tablet Delayed Response 10/14/2021 at 1900 Patient Yes Yes   Sig: Take 81 mg by mouth every evening.   bisoprolol (ZEBETA) 5 MG Tab 10/15/2021 at 0930 Patient Yes Yes   Sig: Take 5 mg by mouth 2 times a day.   isosorbide mononitrate SR (IMDUR) 30 MG TABLET SR 24 HR 10/15/2021 at 1000 Patient Yes Yes   Sig: Take 30 mg by mouth 1/2 hour after breakfast.   levothyroxine (SYNTHROID) 112 MCG Tab 10/14/2021 at AM Patient Yes Yes   Sig: Take 112 mcg by mouth see administration instructions. Take 1 tablet (112 mcg) every morning on an empty stomach. SKIP MONDAYS AND FRIDAYS.   magnesium chloride (MAG-64) 64 MG Tablet Delayed Response 10/15/2021 at 1000 Patient Yes Yes   Sig: Take 64 mg by mouth 1/2 hour after breakfast.   potassium chloride SA (K-DUR) 10 MEQ Tab CR 10/15/2021 at 1000 Patient Yes Yes   Sig: Take 10 mEq by mouth 1/2 hour after breakfast.    rosuvastatin (CRESTOR) 10 MG Tab 10/14/2021 at 1900 Patient Yes Yes   Sig: Take 10 mg by mouth every evening.      Facility-Administered Medications: None       Physical Exam  Temp:  [35.8 °C (96.5 °F)-36.4 °C (97.5 °F)] 36.4 °C (97.5 °F)  Pulse:  [67-87] 67  Resp:  [16-19] 19  BP: (130-138)/(65-76) 137/65  SpO2:  [91 %-100 %] 94 %  Blood Pressure : 137/65   Temperature: 36.4 °C (97.5 °F)   Pulse: 67   Respiration: 19   Pulse Oximetry: 94 %       Physical Exam  Vitals and nursing note reviewed.   Constitutional:       General: She is not in acute distress.     Appearance: Normal appearance. She is not ill-appearing.   HENT:      Head: Normocephalic and atraumatic.      Mouth/Throat:      Mouth: Mucous membranes are moist.      Pharynx: Oropharynx is clear.   Eyes:      General: No scleral icterus.     Extraocular Movements: Extraocular movements intact.      Conjunctiva/sclera: Conjunctivae normal.      Pupils: Pupils are equal, round, and reactive to light.   Cardiovascular:      Rate and Rhythm: Normal rate and regular rhythm.      Pulses: Normal pulses.      Heart sounds: Normal heart sounds.   Pulmonary:      Effort: Pulmonary effort is normal.      Breath sounds: Rhonchi present.   Abdominal:      General: Abdomen is flat. Bowel sounds are normal. There is no distension.      Palpations: Abdomen is soft.      Tenderness: There is no abdominal tenderness. There is no guarding.   Musculoskeletal:         General: Swelling present. No tenderness.      Cervical back: Normal range of motion and neck supple. No rigidity.   Skin:     General: Skin is warm and dry.      Coloration: Skin is not jaundiced.   Neurological:      General: No focal deficit present.      Mental Status: She is alert and oriented to person, place, and time. Mental status is at baseline.      Cranial Nerves: No cranial nerve deficit.      Motor: No weakness.   Psychiatric:         Mood and Affect: Mood normal.         Behavior: Behavior  normal.         Thought Content: Thought content normal.         Judgment: Judgment normal.         Laboratory:  Recent Labs     10/15/21  1404   WBC 5.6   RBC 3.81*   HEMOGLOBIN 12.2   HEMATOCRIT 36.9*   MCV 96.9   MCH 32.0   MCHC 33.1*   RDW 53.2*   PLATELETCT 171   MPV 11.1     Recent Labs     10/15/21  1404   SODIUM 132*   POTASSIUM 4.0   CHLORIDE 97   CO2 24   GLUCOSE 118*   BUN 16   CREATININE 0.77   CALCIUM 8.9     Recent Labs     10/15/21  1404   ALTSGPT 41   ASTSGOT 56*   ALKPHOSPHAT 73   TBILIRUBIN 1.1   GLUCOSE 118*         Recent Labs     10/15/21  1404   NTPROBNP 1865*         Recent Labs     10/15/21  1404   TROPONINT 21*       Imaging:  DX-CHEST-PORTABLE (1 VIEW)   Final Result      1.  Mild cardiomegaly and minimal interstitial edema.   2.  A few areas of linear atelectasis/scarring.         EC-ECHOCARDIOGRAM COMPLETE W/O CONT    (Results Pending)   US-EXTREMITY VENOUS LOWER BILAT    (Results Pending)       X-Ray:  I have personally reviewed the images and compared with prior images.    Assessment/Plan:  I anticipate this patient will require at least two midnights for appropriate medical management, necessitating inpatient admission.    * SOB (shortness of breath)- (present on admission)  Assessment & Plan  -Multifactorial from covid and CHF exacerbation  -Will address both with fluid restriction and start on decadron for covid 19  -Monitor SpO2 and keep > 88%  -Strict I/Os  -Daily weight  -ECHO pending  -Started on lasix, monitor BUN/Cr  -Started on ACE-I monitor BP  -Cardiac diet  -Patient complains of leg swelling and recent 4 day history of driving. As she is on Eliquis, dvt is unlikely however will obtain ultrasound to rule it out as she does have high risk factors     Hypothyroidism- (present on admission)  Assessment & Plan  -TSH  -Continue home meds    Elevated troponin- (present on admission)  Assessment & Plan  -Likely demand, trend    Acute UTI- (present on admission)  Assessment &  Plan  -Will start rocephin 1gm qd    HTN (hypertension)- (present on admission)  Assessment & Plan  -Resume home meds    Paroxysmal atrial fibrillation (HCC)- (present on admission)  Assessment & Plan  -Continue Eliquis     COVID-19 virus infection- (present on admission)  Assessment & Plan  -Enhanced precaution  -Decadron  -Will obtain PCR test  -Monitor SpO2  -Patient cannot lay flat       VTE prophylaxis: therapeutic anticoagulation with eliquis

## 2021-10-16 NOTE — CARE PLAN
The patient is Watcher - Medium risk of patient condition declining or worsening         Progress made toward(s) clinical / shift goals: Progressing      Problem: Care Map:  Admission Optimal Outcome for the Heart Failure Patient  Goal: Admission:  Optimal Care of the heart failure patient  Outcome: Progressing  Note: Pt educated on the use of lasix in regards to her HF.      Problem: Knowledge Deficit - Standard  Goal: Patient and family/care givers will demonstrate understanding of plan of care, disease process/condition, diagnostic tests and medications  Outcome: Progressing  Note: Pt updated on POC. No questions at this time.

## 2021-10-16 NOTE — PROGRESS NOTES
Pt received from ED. Tele monitor in place. VSS. Pt oriented to room. Educated on use of the call light. Pt demonstrated use of the call light. Discussed POC. All questions answered.

## 2021-10-16 NOTE — PROGRESS NOTES
4 Eyes Skin Assessment Completed by Melly RN and Miki RN.    Head WDL  Ears WDL  Nose WDL  Mouth WDL  Neck WDL  Breast/Chest WDL  Shoulder Blades WDL  Spine Redness  (R) Arm/Elbow/Hand WDL  (L) Arm/Elbow/Hand WDL  Abdomen WDL  Groin WDL  Scrotum/Coccyx/Buttocks WDL  (R) Leg WDL  (L) Leg WDL  (R) Heel/Foot/Toe WDL  (L) Heel/Foot/Toe WDL          Devices In Places Tele Box and Pacer      Interventions In Place N/A    Possible Skin Injury No    Pictures Uploaded Into Epic N/A  Wound Consult Placed N/A  RN Wound Prevention Protocol Ordered No

## 2021-10-16 NOTE — ASSESSMENT & PLAN NOTE
-Multifactorial from covid and CHF exacerbation  -Will address both with fluid restriction and start on decadron for covid 19  -Monitor SpO2 and keep > 88%  -Strict I/Os  -Daily weight  -ECHO pending  -Started on lasix, monitor BUN/Cr  -Started on ACE-I monitor BP  -Cardiac diet  -Patient complains of leg swelling and recent 4 day history of driving. As she is on Eliquis, dvt is unlikely however will obtain ultrasound to rule it out as she does have high risk factors

## 2021-10-16 NOTE — PROGRESS NOTES
Assumed care at 0700, bedside report received from night shift RN. Pt on the monitor. Initial assessment completed, orders reviewed, call light within reach, bed alarm in use, and hourly rounding in place. POC addressed with patient, no additional questions at this time.

## 2021-10-16 NOTE — ED NOTES
Pt medicated per MAR.Spoke to patient's son tex Alba per pt. POC updated, verbalized understanding.

## 2021-10-16 NOTE — DISCHARGE INSTRUCTIONS
Discharge Instructions    Discharged to home by car with relative. Discharged via wheelchair, hospital escort: Yes.  Special equipment needed: Not Applicable    Be sure to schedule a follow-up appointment with your primary care doctor or any specialists as instructed.     Discharge Plan:   Diet Plan: Discussed  Activity Level: Discussed  Confirmed Follow up Appointment: Patient to Call and Schedule Appointment  Confirmed Symptoms Management: Discussed  Medication Reconciliation Updated: Yes  Influenza Vaccine Indication: Patient Refuses    I understand that a diet low in cholesterol, fat, and sodium is recommended for good health. Unless I have been given specific instructions below for another diet, I accept this instruction as my diet prescription.       Special Instructions: None    · Is patient discharged on Warfarin / Coumadin?   No     Depression / Suicide Risk    As you are discharged from this [facility] facility, it is important to learn how to keep safe from harming yourself.    Recognize the warning signs:  · Abrupt changes in personality, positive or negative- including increase in energy   · Giving away possessions  · Change in eating patterns- significant weight changes-  positive or negative  · Change in sleeping patterns- unable to sleep or sleeping all the time   · Unwillingness or inability to communicate  · Depression  · Unusual sadness, discouragement and loneliness  · Talk of wanting to die  · Neglect of personal appearance   · Rebelliousness- reckless behavior  · Withdrawal from people/activities they love  · Confusion- inability to concentrate     If you or a loved one observes any of these behaviors or has concerns about self-harm, here's what you can do:  · Talk about it- your feelings and reasons for harming yourself  · Remove any means that you might use to hurt yourself (examples: pills, rope, extension cords, firearm)  · Get professional help from the community (Mental Health,  Substance Abuse, psychological counseling)  · Do not be alone:Call your Safe Contact- someone whom you trust who will be there for you.  · Call your local CRISIS HOTLINE 644-7679 or 844-233-0562  · Call your local Children's Mobile Crisis Response Team [place] (472) 885-0176 or www.[employer]  · Call the toll free National Suicide Prevention Hotlines   · National Suicide Prevention Lifeline 027-241-MQMX (6869)  · National Hope Line Network 800-SUICIDE (525-8033)

## 2021-10-16 NOTE — DISCHARGE SUMMARY
"PATIENT SUMMARY     Admit Date:  10/15/2021       Discharge Date:   10/16/21    Service:   [facility]  Attending Physician(s):   Dr. Ruiz       Senior Resident(s):   Dr. Marin  Aden Resident(s):   Dr. Angela      Primary Diagnosis:   UTI  COVID-19 infection  CHF  Secondary Diagnoses:                Hypothyroidism  pAfib  Hx of defibrillator placement  Hx of 6 ablations    Subjective/overnight events:                Pt reports that this AM she \"feels better than I have in a week\". Ate breakfast. Breathing well on RA. Hopeful for discharge.    Physical Exam:                Vitals and nursing note reviewed.   Constitutional:       General: She is not in acute distress.     Appearance: Normal appearance. She is not ill-appearing.   HENT:      Head: Normocephalic and atraumatic.      Mouth/Throat:      Mouth: Mucous membranes are moist.      Pharynx: Oropharynx is clear.   Eyes:      General: No scleral icterus.     Extraocular Movements: Extraocular movements intact.      Conjunctiva/sclera: Conjunctivae normal.      Pupils: Pupils are equal, round, and reactive to light.   Cardiovascular:      Rate and Rhythm: Normal rate and regular rhythm.      Pulses: Normal pulses.      Heart sounds: Normal heart sounds.   Pulmonary:      Effort: Pulmonary effort is normal.      Breath sounds: CTAB .   Abdominal:      General: Abdomen is flat. Bowel sounds are normal. There is no distension.      Palpations: Abdomen is soft.      Tenderness: There is no abdominal tenderness. There is no guarding.   Musculoskeletal:         General: No edema present. No tenderness.      Cervical back: Normal range of motion and neck supple. No rigidity.   Skin:     General: Skin is warm and dry.      Coloration: Skin is not jaundiced.   Neurological:      General: No focal deficit present.      Mental Status: She is alert and oriented to person, place, and time. Mental status is at baseline.      Cranial Nerves: No cranial " nerve deficit.      Motor: No weakness.   Psychiatric:         Mood and Affect: Mood normal.         Behavior: Behavior normal.         Thought Content: Thought content normal.         Judgment: Judgment normal.     Hospital Summary (Brief Narrative):       Zenia Sidhu is a 85 yo female with a hx of afib, defibrillator placement, multiple ablations,  HTN, and hypothyroidism who is vaccinated against covid who was admitted to the hospital for swelling of her legs, a UTI, and covid-19.    Swelling of legs: It is likely that she had a new onset CHF exacerbation. Echo is still pending. On my examination today she did not have any swelling and the patient denies that she had swelling of her legs. Regardless, given the other physicians observations she will be discharged home with lasix, 5 mg lisinopril, and her current beta blocker regimen.    COVID-19: The pt reports that she has felt fatigued for around 5 days and has had diarrhea. Yesterday it worsened which is why she came in. This AM she reports that she feels the best that she has felt in a week and actually managed to eat her whole breakfast. She does not meet any criteria to be discharged with decadron as she has never required oxygen to maintain saturations.    UTI: She reports increased frequency the night prior to admission. She denies burning, CVA tenderness, fevers, or chills. Her WBC was not elevated. She received one dose of C3 while in the hospital and was rxed with 5 days of nitrofurantoin. She does not have any evidence of kidney disease.      Consultants:      None    Procedures:        None    Imaging/ Testing:      EC-ECHOCARDIOGRAM LTD W/O CONT         US-EXTREMITY VENOUS LOWER BILAT   Final Result      DX-CHEST-PORTABLE (1 VIEW)   Final Result      1.  Mild cardiomegaly and minimal interstitial edema.   2.  A few areas of linear atelectasis/scarring.             ECHO pending. If patient discharged prior to echo will call patient with  results.    Discharge Medications:           Medication List      START taking these medications      Instructions   furosemide 20 MG Tabs  Commonly known as: LASIX   Take 1 Tablet by mouth every day. In the AM.  Dose: 20 mg     lisinopril 5 MG Tabs  Start taking on: October 17, 2021  Commonly known as: PRINIVIL   Take 1 Tablet by mouth every day.  Dose: 5 mg     nitrofurantoin 100 MG Caps  Commonly known as: Macrobid   Take 1 Capsule by mouth 2 times a day for 10 doses. One capsule in the AM and one in the PM  Dose: 100 mg        CONTINUE taking these medications      Instructions   aspirin EC 81 MG Tbec  Commonly known as: ECOTRIN   Take 81 mg by mouth every evening.  Dose: 81 mg     bisoprolol 5 MG Tabs  Commonly known as: ZEBETA   Take 5 mg by mouth 2 times a day.  Dose: 5 mg     COENZYME Q10 PO   Take 1 Tablet by mouth 1/2 hour after breakfast.  Dose: 1 Tablet     Eliquis 5mg Tabs  Generic drug: apixaban   Take 5 mg by mouth 2 times a day.  Dose: 5 mg     FOLIC ACID PO   Take 1 Tablet by mouth 1/2 hour after breakfast.  Dose: 1 Tablet     isosorbide mononitrate SR 30 MG Tb24  Commonly known as: IMDUR   Take 30 mg by mouth 1/2 hour after breakfast.  Dose: 30 mg     levothyroxine 112 MCG Tabs  Commonly known as: SYNTHROID   Take 112 mcg by mouth see administration instructions. Take 1 tablet (112 mcg) every morning on an empty stomach. SKIP MONDAYS AND FRIDAYS.  Dose: 112 mcg     magnesium chloride 64 MG Tbec  Commonly known as: MAG-64   Take 64 mg by mouth 1/2 hour after breakfast.  Dose: 64 mg     potassium chloride SA 10 MEQ Tbcr  Commonly known as: K-DUR   Take 10 mEq by mouth 1/2 hour after breakfast.  Dose: 10 mEq     rosuvastatin 10 MG Tabs  Commonly known as: CRESTOR   Take 10 mg by mouth every evening.  Dose: 10 mg     VITAMIN B-12 PO   Take 1 Tablet by mouth 1/2 hour after breakfast.  Dose: 1 Tablet     VITAMIN D3 PO   Take 1 Tablet by mouth 1/2 hour after breakfast.  Dose: 1 Tablet     * Womens  Multivitamin Tabs   Take 1 Tablet by mouth 1/2 hour after breakfast.  Dose: 1 Tablet     * EYE VITAMINS & MINERALS PO   Take 1 Tablet by mouth 1/2 hour after breakfast.  Dose: 1 Tablet     ZINC SULFATE PO   Take 1 Tablet by mouth 1/2 hour after breakfast.  Dose: 1 Tablet         * This list has 2 medication(s) that are the same as other medications prescribed for you. Read the directions carefully, and ask your doctor or other care provider to review them with you.                  Disposition:   Discharge    Diet:   As tolerated    Activity:   As tolerated    Instructions:         The patient was instructed to return to the ER in the event of worsening symptoms. I have counseled the patient on the importance of compliance and the patient has agreed to proceed with all medical recommendations and follow up plan indicated above.   The patient understands that all medications come with benefits and risks. Risks may include permanent injury or death and these risks can be minimized with close reassessment and monitoring.        Primary Care Provider:      ()  Discharge summary faxed to primary care provider  (x)  Copy of discharge summary given to the patient    Follow up appointment details :      PCP in 2-3 days  Cardiologist in 1 to 2 weeks    Pending Studies:            Time spent on discharge day patient visit, preparing discharge paperwork and arranging for patient follow up.  (x)  Less than 30 minutes  ()  Greater than 30 minutes      Mason Angela MD  [id number]  [facility]

## 2021-10-17 LAB
BACTERIA UR CULT: ABNORMAL
BACTERIA UR CULT: ABNORMAL
SIGNIFICANT IND 70042: ABNORMAL
SITE SITE: ABNORMAL
SOURCE SOURCE: ABNORMAL

## 2021-11-05 ENCOUNTER — OFFICE VISIT (OUTPATIENT)
Dept: URGENT CARE | Facility: PHYSICIAN GROUP | Age: 86
End: 2021-11-05
Payer: MEDICARE

## 2021-11-05 ENCOUNTER — HOSPITAL ENCOUNTER (OUTPATIENT)
Dept: RADIOLOGY | Facility: MEDICAL CENTER | Age: 86
End: 2021-11-05
Attending: NURSE PRACTITIONER
Payer: MEDICARE

## 2021-11-05 VITALS
DIASTOLIC BLOOD PRESSURE: 70 MMHG | WEIGHT: 135 LBS | OXYGEN SATURATION: 92 % | SYSTOLIC BLOOD PRESSURE: 124 MMHG | HEIGHT: 63 IN | TEMPERATURE: 97.7 F | HEART RATE: 94 BPM | BODY MASS INDEX: 23.92 KG/M2 | RESPIRATION RATE: 24 BRPM

## 2021-11-05 DIAGNOSIS — R05.9 COUGH: ICD-10-CM

## 2021-11-05 DIAGNOSIS — J06.9 URI, ACUTE: ICD-10-CM

## 2021-11-05 LAB
FLUAV+FLUBV AG SPEC QL IA: NEGATIVE
INT CON NEG: NEGATIVE
INT CON POS: POSITIVE

## 2021-11-05 PROCEDURE — 99214 OFFICE O/P EST MOD 30 MIN: CPT | Performed by: NURSE PRACTITIONER

## 2021-11-05 PROCEDURE — 87804 INFLUENZA ASSAY W/OPTIC: CPT | Performed by: NURSE PRACTITIONER

## 2021-11-05 PROCEDURE — 71046 X-RAY EXAM CHEST 2 VIEWS: CPT

## 2021-11-05 RX ORDER — BENZONATATE 100 MG/1
100 CAPSULE ORAL 3 TIMES DAILY PRN
Qty: 60 CAPSULE | Refills: 0 | Status: SHIPPED | OUTPATIENT
Start: 2021-11-05

## 2021-11-05 RX ORDER — AZITHROMYCIN 250 MG/1
TABLET, FILM COATED ORAL
Qty: 6 TABLET | Refills: 0 | Status: SHIPPED | OUTPATIENT
Start: 2021-11-05

## 2021-11-05 ASSESSMENT — ENCOUNTER SYMPTOMS
SORE THROAT: 1
COUGH: 1
FEVER: 0

## 2021-11-05 ASSESSMENT — FIBROSIS 4 INDEX: FIB4 SCORE: 3.87

## 2021-11-05 NOTE — PROGRESS NOTES
Subjective     Zenia Sidhu is a 86 y.o. female who presents with Pharyngitis (Productive cough. Onset 1 day. )    Past Medical History:   Diagnosis Date   • Pacemaker      Social History     Socioeconomic History   • Marital status:      Spouse name: Not on file   • Number of children: Not on file   • Years of education: Not on file   • Highest education level: Not on file   Occupational History   • Not on file   Tobacco Use   • Smoking status: Never Smoker   • Smokeless tobacco: Never Used   Vaping Use   • Vaping Use: Never used   Substance and Sexual Activity   • Alcohol use: Yes     Alcohol/week: 1.2 oz     Types: 2 Glasses of wine per week   • Drug use: Never   • Sexual activity: Not on file   Other Topics Concern   • Not on file   Social History Narrative   • Not on file     Social Determinants of Health     Financial Resource Strain:    • Difficulty of Paying Living Expenses:    Food Insecurity:    • Worried About Running Out of Food in the Last Year:    • Ran Out of Food in the Last Year:    Transportation Needs:    • Lack of Transportation (Medical):    • Lack of Transportation (Non-Medical):    Physical Activity:    • Days of Exercise per Week:    • Minutes of Exercise per Session:    Stress:    • Feeling of Stress :    Social Connections:    • Frequency of Communication with Friends and Family:    • Frequency of Social Gatherings with Friends and Family:    • Attends [employer] Services:    • Active Member of Clubs or Organizations:    • Attends Club or Organization Meetings:    • Marital Status:    Intimate Partner Violence:    • Fear of Current or Ex-Partner:    • Emotionally Abused:    • Physically Abused:    • Sexually Abused:      History reviewed. No pertinent family history.    Allergies: Patient has no known allergies.    Patient is a 86-year-old female who presents today with complaint of cough and fatigue and malaise.  Symptoms started over the last week.  Patient states 2 weeks ago  "she was diagnosed with Covid.  She thought she had recovered but over the last 2 to 3 days has noted cough and sore throat with fatigue.  She denies shortness of breath or difficulty breathing.  Patient was hospitalized from October 15-16.          Pharyngitis   This is a new problem. The current episode started in the past 7 days. The problem has been unchanged. Neither side of throat is experiencing more pain than the other. There has been no fever. Associated symptoms include congestion and coughing. She has tried nothing for the symptoms.       Review of Systems   Constitutional: Positive for malaise/fatigue. Negative for fever.   HENT: Positive for congestion and sore throat.    Respiratory: Positive for cough.               Objective     /70 (BP Location: Right arm, Patient Position: Sitting, BP Cuff Size: Adult)   Pulse 94   Temp 36.5 °C (97.7 °F) (Temporal)   Resp (!) 24   Ht 1.6 m (5' 3\")   Wt 61.2 kg (135 lb)   SpO2 92%   BMI 23.91 kg/m²      Physical Exam  Vitals reviewed.   Constitutional:       Appearance: Normal appearance.   HENT:      Head: Normocephalic and atraumatic.      Right Ear: Tympanic membrane, ear canal and external ear normal.      Left Ear: Tympanic membrane, ear canal and external ear normal.      Nose: Congestion present.      Mouth/Throat:      Mouth: Mucous membranes are moist.   Eyes:      Extraocular Movements: Extraocular movements intact.      Conjunctiva/sclera: Conjunctivae normal.      Pupils: Pupils are equal, round, and reactive to light.   Cardiovascular:      Rate and Rhythm: Regular rhythm.      Heart sounds: Normal heart sounds.   Pulmonary:      Effort: Pulmonary effort is normal. No respiratory distress.      Breath sounds: Normal breath sounds. No stridor. No wheezing, rhonchi or rales.   Chest:      Chest wall: No tenderness.   Musculoskeletal:         General: Normal range of motion.      Cervical back: Normal range of motion and neck supple.   Skin:    "  General: Skin is warm.      Capillary Refill: Capillary refill takes less than 2 seconds.   Neurological:      Mental Status: She is alert and oriented to person, place, and time.   Psychiatric:         Mood and Affect: Mood normal.         Behavior: Behavior normal.       poct strep: negative     poct influenza: negative            XR chest:     IMPRESSION:     1.  Interval resolution of the previously noted changes of interstitial edema.  2.  Probable parenchymal scarring without change in the right mid lateral hemithorax.             Assessment & Plan   URI  Sore throat  Cough    zithromax  Tessalon PRN cough  Strict ER precautions for shortness of breath, weakness or chest pain     There are no diagnoses linked to this encounter.

## 2022-02-14 RX ORDER — FUROSEMIDE 20 MG/1
20 TABLET ORAL DAILY
Qty: 90 TABLET | Refills: 3 | OUTPATIENT
Start: 2022-02-14 | End: 2022-05-15
